# Patient Record
Sex: MALE | Race: WHITE
[De-identification: names, ages, dates, MRNs, and addresses within clinical notes are randomized per-mention and may not be internally consistent; named-entity substitution may affect disease eponyms.]

---

## 2019-06-14 ENCOUNTER — HOSPITAL ENCOUNTER (EMERGENCY)
Dept: HOSPITAL 75 - ER FS | Age: 39
Discharge: HOME | End: 2019-06-14
Payer: COMMERCIAL

## 2019-06-14 VITALS — HEIGHT: 75 IN | BODY MASS INDEX: 24.87 KG/M2 | WEIGHT: 200 LBS

## 2019-06-14 VITALS — DIASTOLIC BLOOD PRESSURE: 89 MMHG | SYSTOLIC BLOOD PRESSURE: 137 MMHG
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
## 2019-06-14 DIAGNOSIS — Z23: ICD-10-CM

## 2019-06-14 DIAGNOSIS — S61.313A: Primary | ICD-10-CM

## 2019-06-14 PROCEDURE — 73140 X-RAY EXAM OF FINGER(S): CPT

## 2019-06-14 PROCEDURE — 90715 TDAP VACCINE 7 YRS/> IM: CPT

## 2019-06-14 PROCEDURE — 12001 RPR S/N/AX/GEN/TRNK 2.5CM/<: CPT

## 2019-06-14 NOTE — ED UPPER EXTREMITY
General


Chief Complaint:  Upper Extremity


Stated Complaint:  WC LT FINGER CRUSH/LAC INJ


Source:  patient


Exam Limitations:  no limitations





History of Present Illness


Date Seen by Provider:  Jun 14, 2019


Time Seen by Provider:  09:32


Initial Comments


Got his left little finger pinched in crushed between 2 pieces of metal at work.

 He sustained a laceration to the palmar aspect of the DIP joint.  This is 

approximately 2 cm long.  No other injury





Allergies and Home Medications


Allergies


Coded Allergies:  


     No Known Drug Allergies (Unverified , 6/14/19)





Patient Home Medication List


Home Medication List Reviewed:  Yes





Review of Systems


Constitutional:  no symptoms reported


Respiratory:  no symptoms reported


Cardiovascular:  no symptoms reported


Skin:  see HPI





Past Medical-Social-Family Hx


Patient Social History


Alcohol Use:  Denies Use





Physical Exam


Vital Signs





Vital Signs - First Documented








 6/14/19





 09:28


 


Temp 98.8


 


Pulse 73


 


Resp 16


 


B/P (MAP) 137/89 (105)


 


Pulse Ox 99


 


O2 Delivery Room Air





Capillary Refill :


Height, Weight, BMI


Height: '"


Weight: lbs. oz. kg;  BMI


Method:


General Appearance:  WD/WN, no apparent distress


Cardiovascular:  regular rate, rhythm


Respiratory:  lungs clear


Hand:  laceration (there is a 2 cm laceration over the volar aspect of the left 

third DIP joint.)





Procedures/Interventions





   Wound Location:  Upper Extremities


Other Wound Location


DIP joint left third finger


   Wound Length (cm):  2


   Wound's Depth, Shape:  sub Q


   Wound Explored:  clean


   Irrigated w/ Saline (ccs):  20


   Volume Anesthetic (ccs):  5


   Wound Debrided:  minimal


   Suture:  Ethlion


   Suture Size:  5-0


   Number of Sutures:  4


   Sterile Dressing Applied?:  Yes





Progress/Results/Core Measures


Results/Orders


My Orders





Orders - SUMMER DAVIS MD


Finger(S) (6/14/19 09:31)


DiphtAgustin(Acell),Tet Adult (Boostrix (6/14/19 09:45)


Lidocaine 1% Inj 20 Ml (Xylocaine 1% Inj (6/14/19 09:36)


Lidocaine 1% Inj 20 Ml (Xylocaine 1% Inj (6/14/19 10:00)





Vital Signs/I&O











 6/14/19





 09:28


 


Temp 98.8


 


Pulse 73


 


Resp 16


 


B/P (MAP) 137/89 (105)


 


Pulse Ox 99


 


O2 Delivery Room Air











Departure


Impression





   Primary Impression:  


   left third finger laceration


Disposition:  01 HOME, SELF-CARE


Condition:  Stable





Departure-Patient Inst.


Decision time for Depature:  09:56


Referrals:  


NO,LOCAL PHYSICIAN (PCP)


Primary Care Physician


Patient Instructions:  Laceration Repair





Add. Discharge Instructions:  


Keep bandage clean and dry.  Daily dressing changes.  Sutures out in 10-14 days.

 All discharge instructions reviewed with patient and/or family. Voiced un

derstanding.











SUMMER DAVIS MD              Jun 14, 2019 09:37

## 2019-06-14 NOTE — DIAGNOSTIC IMAGING REPORT
CLINICAL INDICATION: Patient smashed finger in hinged equipment

at work. Attention third digit.



EXAM: X-ray of the left third finger, 3 views.



COMPARISON: None.



FINDINGS AND IMPRESSION:

1:  There is no acute fracture or dislocation. There is no

significant bone or joint abnormality.



2:  The remainder of the visualized portions of the left hand is

unremarkable.



Dictated by: 



  Dictated on workstation # [id number]

## 2019-06-25 ENCOUNTER — HOSPITAL ENCOUNTER (EMERGENCY)
Dept: HOSPITAL 75 - ER FS | Age: 39
Discharge: HOME | End: 2019-06-25
Payer: COMMERCIAL

## 2019-06-25 VITALS — HEIGHT: 75 IN | WEIGHT: 200 LBS | BODY MASS INDEX: 24.87 KG/M2

## 2019-06-25 VITALS — SYSTOLIC BLOOD PRESSURE: 123 MMHG | DIASTOLIC BLOOD PRESSURE: 77 MMHG

## 2019-06-25 DIAGNOSIS — S61.213D: Primary | ICD-10-CM
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
